# Patient Record
Sex: MALE | Race: WHITE | Employment: FULL TIME | URBAN - METROPOLITAN AREA
[De-identification: names, ages, dates, MRNs, and addresses within clinical notes are randomized per-mention and may not be internally consistent; named-entity substitution may affect disease eponyms.]

---

## 2019-08-26 ENCOUNTER — OFFICE VISIT (OUTPATIENT)
Dept: PULMONOLOGY | Age: 44
End: 2019-08-26
Payer: COMMERCIAL

## 2019-08-26 VITALS
RESPIRATION RATE: 16 BRPM | BODY MASS INDEX: 25.62 KG/M2 | SYSTOLIC BLOOD PRESSURE: 116 MMHG | WEIGHT: 173 LBS | HEIGHT: 69 IN | DIASTOLIC BLOOD PRESSURE: 73 MMHG | TEMPERATURE: 98 F | HEART RATE: 67 BPM | OXYGEN SATURATION: 98 %

## 2019-08-26 DIAGNOSIS — R06.83 SNORING: Primary | ICD-10-CM

## 2019-08-26 DIAGNOSIS — G47.10 HYPERSOMNIA: ICD-10-CM

## 2019-08-26 DIAGNOSIS — F45.8 BRUXISM: ICD-10-CM

## 2019-08-26 DIAGNOSIS — R53.83 FATIGUE, UNSPECIFIED TYPE: ICD-10-CM

## 2019-08-26 PROCEDURE — 99243 OFF/OP CNSLTJ NEW/EST LOW 30: CPT | Performed by: INTERNAL MEDICINE

## 2019-08-26 ASSESSMENT — SLEEP AND FATIGUE QUESTIONNAIRES
ESS TOTAL SCORE: 8
HOW LIKELY ARE YOU TO NOD OFF OR FALL ASLEEP WHEN YOU ARE A PASSENGER IN A CAR FOR AN HOUR WITHOUT A BREAK: 2
HOW LIKELY ARE YOU TO NOD OFF OR FALL ASLEEP IN A CAR, WHILE STOPPED FOR A FEW MINUTES IN TRAFFIC: 0
HOW LIKELY ARE YOU TO NOD OFF OR FALL ASLEEP WHILE SITTING AND READING: 1
HOW LIKELY ARE YOU TO NOD OFF OR FALL ASLEEP WHILE SITTING INACTIVE IN A PUBLIC PLACE: 0
HOW LIKELY ARE YOU TO NOD OFF OR FALL ASLEEP WHILE SITTING AND TALKING TO SOMEONE: 0
HOW LIKELY ARE YOU TO NOD OFF OR FALL ASLEEP WHILE LYING DOWN TO REST IN THE AFTERNOON WHEN CIRCUMSTANCES PERMIT: 3
HOW LIKELY ARE YOU TO NOD OFF OR FALL ASLEEP WHILE SITTING QUIETLY AFTER LUNCH WITHOUT ALCOHOL: 1
NECK CIRCUMFERENCE (INCHES): 15.5
HOW LIKELY ARE YOU TO NOD OFF OR FALL ASLEEP WHILE WATCHING TV: 1

## 2019-08-26 NOTE — PROGRESS NOTES
[facility]                                                                  CHIEF COMPLAINT: Snoring    Consulting provider: Bertrand Rider MD      HPI:   Snoring at night for the past >10 years. The severity of snoring is severe. Not sure what makes better or worse. No observed sleep apnea. No restorative sleep. No dry mouth upon awakening. Patient is complaining of daytime sleepiness, fatigue and tiredness at times during the day. He could dose off anytime durign the day. Bedtime 10-11 pm and rise time is 6-7 am. Sleep onset < 15 minutes. 1-2 nocturia. 1 nap/weekend for 1 hr. No car wrecks or near wrecks because of the sleepiness. No nodding off while driving. Drinks 0 caffinated beverages per day. PMH:   None       Past Surgical History:        Procedure Laterality Date    ANTERIOR CRUCIATE LIGAMENT REPAIR      LASIK      bilateral eyes    NASAL SEPTUM SURGERY      2006       Allergies:  has No Known Allergies. Social History:    TOBACCO:   reports that he has never smoked. He has never used smokeless tobacco.  ETOH:   has no alcohol history on file. Family History:   No JENNIFER       Current Medications:  No meds       REVIEW OF SYSTEMS:  Constitutional: Negative for fever  HENT: Negative for sore throat  Eyes: Negative for redness   Respiratory: Negative for dyspnea, cough  Cardiovascular: Negative for chest pain  Gastrointestinal: Negative for vomiting, diarrhea   Genitourinary: Negative for hematuria   Musculoskeletal: Negative for arthralgias   Skin: Negative for rash  Neurological: Negative for syncope  Hematological: Negative for adenopathy  Psychiatric/Behavorial: Negative for anxiety      Objective:   PHYSICAL EXAM:    Blood pressure 116/73, pulse 67, temperature 98 °F (36.7 °C), temperature source Oral, resp. rate 16, height 5' 9\" (1.753 m), weight 173 lb (78.5 kg), SpO2 98 %.' on RA  Gen: No distress.  BMI of

## 2019-08-26 NOTE — COMMUNICATION BODY
Assessment:       · Snoring  · Hypersomnia   · Fatigue   · Bruxism       Plan:       HST evaluate for sleep related breathing disorder. Treatment options were discussed with patient if HST reveals JENNIFER, including CPAP therapy, oral appliances, upper airway surgery and hypoglossal nerve stimulation. APAP min pressure of 8 and max pressure of 16 cmH2O if positive HST   Discussed with patient the pathophysiology of apnea. Sleep hygiene  Avoid sedatives, alcohol and caffeinated drinks at bed time. No driving motorized vehicles or operating heavy machinery while fatigue, drowsy or sleepy. Weight loss is also recommended as a long-term intervention. Complications of JENNIFER if not treated were discussed with patient patient to include systemic hypertension, pulmonary hypertension, cardiovascular morbidities, car accidents and all cause mortality.   Continue with mouthguard

## 2019-08-26 NOTE — LETTER
[facility]  [address]  [address]  Phone: [phone]  Fax: [phone]    August 26, 2019     Patient: Prashant Styles   MR Number: [id number]   YOB: 1975   Date of Visit: 8/26/2019     Dear Dr. Lobo Huynh:    Thank you for the request for consultation for Prashant Styles to me for the evaluation. Below are the relevant portions of my assessment and plan of care. Assessment:       · Snoring  · Hypersomnia   · Fatigue   · Bruxism       Plan:       HST evaluate for sleep related breathing disorder. Treatment options were discussed with patient if HST reveals JENNIFER, including CPAP therapy, oral appliances, upper airway surgery and hypoglossal nerve stimulation. APAP min pressure of 8 and max pressure of 16 cmH2O if positive HST   Discussed with patient the pathophysiology of apnea. Sleep hygiene  Avoid sedatives, alcohol and caffeinated drinks at bed time. No driving motorized vehicles or operating heavy machinery while fatigue, drowsy or sleepy. Weight loss is also recommended as a long-term intervention. Complications of JENNIFER if not treated were discussed with patient patient to include systemic hypertension, pulmonary hypertension, cardiovascular morbidities, car accidents and all cause mortality. Continue with mouthguard  If you have questions, please do not hesitate to call me. I look forward to following Linda Osborne along with you.     Sincerely,        Juju Wesley MD

## 2019-09-18 ENCOUNTER — HOSPITAL ENCOUNTER (OUTPATIENT)
Dept: SLEEP CENTER | Age: 44
Discharge: HOME | End: 2019-09-20
Payer: COMMERCIAL

## 2019-09-18 DIAGNOSIS — G47.10 HYPERSOMNIA: ICD-10-CM

## 2019-09-18 DIAGNOSIS — R53.83 FATIGUE, UNSPECIFIED TYPE: ICD-10-CM

## 2019-09-18 DIAGNOSIS — F45.8 BRUXISM: ICD-10-CM

## 2019-09-18 DIAGNOSIS — R06.83 SNORING: ICD-10-CM

## 2019-09-18 PROCEDURE — 95806 SLEEP STUDY UNATT&RESP EFFT: CPT

## 2019-10-23 ENCOUNTER — TELEPHONE (OUTPATIENT)
Dept: PULMONOLOGY | Age: 44
End: 2019-10-23